# Patient Record
Sex: FEMALE | Race: WHITE
[De-identification: names, ages, dates, MRNs, and addresses within clinical notes are randomized per-mention and may not be internally consistent; named-entity substitution may affect disease eponyms.]

---

## 2017-06-18 NOTE — EMERGENCY ROOM VISIT NOTE
History


Report prepared by Mami:  Antonia Oliva


Under the Supervision of:  Dr. Veto Mas M.D.


First contact with patient:  04:55


Chief Complaint:  NECK PAIN


Stated Complaint:  NECK/HEAD PAIN





History of Present Illness


The patient is a 71 year old female who presents to the Emergency Room with 

complaints of neck pain beginning yesterday. She rates the pain at a 6/10. She 

reports that the pain goes from her shoulders to the back of her head, and that 

she was unable to sleep due to the pain. The patient denies fevers, abdominal 

pain, a sore throat, and changes in vision. Notes left lateral hand had some 

"numbness" though can feel well which has been ongoing much longer than neck 

issue.  She has taken no medication this evening for neck pain.  She has not 

had this previously.  Denies trauma nor injuries.  No neuro deficits.  Movement 

makes worse, rest makes better.  Tried cold packs without improvement.





   Source of History:  patient


   Onset:  yesterday


   Position:  neck


   Symptom Intensity:  rated at a 6/10


   Quality:  other (neck pain)


   Associated Symptoms:  No fevers, No sorethroat, No abdominal pain


Note:


additional symptom: numbness in left hand





Review of Systems


See HPI for pertinent positives & negatives. A total of 6 systems reviewed and 

were otherwise negative.





Past Medical & Surgical


Medical Problems:


(1) Hypotension








Family History





FHx: cancer


FHx: gallbladder disease


FHx: heart disease


Hypertension





Social History


Smoking Status:  Never Smoker


Alcohol Use:  none


Marital Status:  





Current/Historical Medications


Scheduled PRN


Cyclobenzaprine Hcl (Flexeril), 10 MG PO TID PRN for Muscle Spasms


Oxycodone Immediate Rel Tab (Roxicodone Ir), 1-2 TAB PO Q4H PRN for Severe Pain





Allergies


Coded Allergies:  


     No Known Allergies (Unverified , 6/12/13)





Physical Exam


Vital Signs











  Date Time  Temp Pulse Resp B/P (MAP) Pulse Ox O2 Delivery O2 Flow Rate FiO2


 


6/18/17 06:27  50 18 140/72 95 Room Air  


 


6/18/17 04:49 36.8 75 18 152/73 93 Room Air  











Physical Exam


GENERAL: Patient is uncomfortable appearing and in mild distress.


HEENT: No acute trauma, normocephalic atraumatic, mucous membranes moist, no 

nasal congestion, no scleral icterus.


NECK: No stridor, no adenopathy, no meningismus, trachea is midline. Paraspinal 

muscle spasms bilaterally with straightening of cervical spine. 


LUNGS: No dyspnea. Clear to auscultation and equal bilaterally. No wheeze, no 

rhonchi.


HEART: Regular rate and rhythm.  No murmurs, rubs, gallops appreciated.


EXTREMITIES: Normal motion all extremities, no cyanosis, no edema.


NEUROLOGIC: Alert and oriented, no acute motor or sensory deficits, no focal 

weakness, cranial nerves grossly intact.


SKIN: No rash, no jaundice, no diaphoresis.





Medical Decision & Procedures


ER Provider


Diagnostic Interpretation:


X ray results are stated below per my interpretation and the radiologist's 

interpretation. 





X ray 4 views cervical spine: Straightening of normal curvature, diffuse 

arthritic changes. No acute step offs nor fractures appreciated.





Laboratory Results


6/18/17 05:09








Red Blood Count 4.84, Mean Corpuscular Volume 88.2, Mean Corpuscular Hemoglobin 

30.0, Mean Corpuscular Hemoglobin Concent 34.0, Mean Platelet Volume 8.6, 

Neutrophils (%) (Auto) 45.0, Lymphocytes (%) (Auto) 37.9, Monocytes (%) (Auto) 

12.6, Eosinophils (%) (Auto) 3.7, Basophils (%) (Auto) 0.6, Neutrophils # (Auto

) 3.65, Lymphocytes # (Auto) 3.08, Monocytes # (Auto) 1.02, Eosinophils # (Auto

) 0.30, Basophils # (Auto) 0.05





6/18/17 05:09

















Test


  6/18/17


05:09


 


White Blood Count


  8.12 K/uL


(4.8-10.8)


 


Red Blood Count


  4.84 M/uL


(4.2-5.4)


 


Hemoglobin


  14.5 g/dL


(12.0-16.0)


 


Hematocrit 42.7 % (37-47) 


 


Mean Corpuscular Volume


  88.2 fL


()


 


Mean Corpuscular Hemoglobin


  30.0 pg


(25-34)


 


Mean Corpuscular Hemoglobin


Concent 34.0 g/dl


(32-36)


 


Platelet Count


  287 K/uL


(130-400)


 


Mean Platelet Volume


  8.6 fL


(7.4-10.4)


 


Neutrophils (%) (Auto) 45.0 % 


 


Lymphocytes (%) (Auto) 37.9 % 


 


Monocytes (%) (Auto) 12.6 % 


 


Eosinophils (%) (Auto) 3.7 % 


 


Basophils (%) (Auto) 0.6 % 


 


Neutrophils # (Auto)


  3.65 K/uL


(1.4-6.5)


 


Lymphocytes # (Auto)


  3.08 K/uL


(1.2-3.4)


 


Monocytes # (Auto)


  1.02 K/uL


(0.11-0.59)


 


Eosinophils # (Auto)


  0.30 K/uL


(0-0.5)


 


Basophils # (Auto)


  0.05 K/uL


(0-0.2)


 


RDW Standard Deviation


  40.1 fL


(36.4-46.3)


 


RDW Coefficient of Variation


  12.5 %


(11.5-14.5)


 


Immature Granulocyte % (Auto) 0.2 % 


 


Immature Granulocyte # (Auto)


  0.02 K/uL


(0.00-0.02)


 


Anion Gap


  10.0 mmol/L


(3-11)


 


Est Creatinine Clear Calc


Drug Dose 70.6 ml/min 


 


 


Estimated GFR (


American) 87.2 


 


 


Estimated GFR (Non-


American 75.2 


 


 


BUN/Creatinine Ratio 23.7 (10-20) 


 


Calcium Level


  8.9 mg/dl


(8.5-10.1)





Laboratory results as reviewed by me.





Medications Administered











 Medications


  (Trade)  Dose


 Ordered  Sig/Dell


 Route  Start Time


 Stop Time Status Last Admin


Dose Admin


 


 Sodium Chloride  1,000 ml @ 


 999 mls/hr  Q1H1M STAT


 IV  6/18/17 05:01


 6/18/17 06:01 DC 6/18/17 05:34


999 MLS/HR


 


 Ketorolac


 Tromethamine


  (Toradol Inj)  30 mg  NOW  STAT


 IV  6/18/17 05:01


 6/18/17 05:04 DC 6/18/17 05:33


30 MG


 


 Morphine Sulfate


  (MoRPHine


 SULFATE INJ)  4 mg  NOW  STAT


 IV  6/18/17 05:01


 6/18/17 05:05 DC 6/18/17 05:33


4 MG


 


 Diazepam


  (Valium Inj)  2.5 mg  NOW  STAT


 IV  6/18/17 05:01


 6/18/17 05:05 DC 6/18/17 05:33


2.5 MG


 


 Oxycodone HCl


  (Roxicodone


 Immediate Rel 5MG


 Home Pack)  1 homepack  UD  ONCE


 PO  6/18/17 06:15


 6/18/17 06:16 DC 6/18/17 06:15


1 HOMEPACK


 


 Cyclobenzaprine


 HCl


  (FLEXERIL 10MG


 Home Pack)  1 homepack  UD  ONCE


 PO  6/18/17 06:15


 6/18/17 06:16 DC 6/18/17 06:15


1 HOMEPACK











ED Course


0456: The patient was evaluated in room B12. A complete history and physical 

exam was performed.





0501: Ordered Diazepam 2.5 mg IV, Morphine Sulfate 4 mg IV, Toradol Inj 30 mg IV

, Sodium Chloride 1,000 ml @ 999 mls/hr IV. 





0538: The patient notes that she is starting to feel better. 





0615: Ordered Cyclobenzaprine HCl 1 homepack PO, Oxycodone HCl 1 homepack PO.





0620: The patient is feeling much better and she would like to see how she does 

at home. She will follow up with her PCP  about her blood pressure and neck 

arthritis.





Medical Decision


Medication Reconciliation:  I attest that I have personally reviewed the patient

's current medication list.


Blood pressure screening: Patient was found to have an elevated blood pressure 

and was referred to their primary doctor for recheck and further treatment.





71 yr old female arrives for evaluation of bilateral neck pain radiating to low 

posterior scalp.  Exam consistent with significant muscle spasm bilaterally and 

cervical imaging with straightening cervical spine.  No acute fracture.  She 

does not have meningitis by exam/history.  She does not have cause nor findings 

of dissection.  No evidence of cellulitis nor abscess.  Labs unremarkable.  

Admits increased gardening and I suspect she did something to strain neck with 

that.  Without neurologic deficits I do not feel neuro imaging indicated.  She 

notes left lateral hand tingling for a long time that has been ongoing without 

change.  I advised she discuss symptoms, BP elevation and ED visit with her 

PCP.  We reviewed symptoms requiring return.  She is feeling much improved and 

comfortable with discharge.  Reviewed restrictions/risks of narcotics, along 

with flexeril.  Stable and feeling well at discharge.





Impression





 Primary Impression:  


 Muscle spasms of neck


 Additional Impressions:  


 Hypertension


 Cervical spine degeneration





Scribe Attestation


The scribe's documentation has been prepared under my direction and personally 

reviewed by me in its entirety. I confirm that the note above accurately 

reflects all work, treatment, procedures, and medical decision making performed 

by me.





Departure Information


Dispostion


Home / Self-Care





Prescriptions





Cyclobenzaprine Hcl (FLEXERIL) 10 Mg Tab


10 MG PO TID Y for Muscle Spasms, #12 TAB


   Prov: Veto Mas M.D.         6/18/17 


Oxycodone Immediate Rel Tab (ROXICODONE IR) 5 Mg Tab


1-2 TAB PO Q4H Y for Severe Pain, #12 TAB


   Prov: Veto Mas M.D.         6/18/17





Referrals


Primary Care Provider





Patient Instructions


ED Spasm Neck No Injury, My [facility]





Additional Instructions





Your blood pressure was elevated during this visit.  This is quite common in 

many people who are being evaluated in the Emergency Department for many 

reasons.  However, it is important that you have your Primary Care Provider 

recheck your blood pressure and discuss whether treatment will be needed.  


Long term elevated blood pressure can lead to strokes, heart attacks, kidney 

failure amongst other medical issues.  If you develop severe headaches, chest 

pain, weakness in arms or legs, or other concerning symptoms call 911.





You have received a narcotic pain medication prescription.  These medications 

may cause drowsiness and should not be used with other sedative medications.  

Do not drive, drink alcohol, perform dangerous activities, nor make important 

decisions after taking these medications.  Long term use or inappropriate use 

may lead to addiction.





Problem Qualifiers

## 2017-06-18 NOTE — DIAGNOSTIC IMAGING REPORT
CERVICAL SPINE 3 VIEWS



HISTORY:      neck spasm/pain



COMPARISON: None.



FINDINGS: The cervical spine is visualized from C1 through the superior endplate

of T1. There is no fracture.  No subluxation. Mild facet degenerative changes

throughout the majority of the cervical spine. There is mild disc space

narrowing and small endplate osteophytes at C4-C5, C5-C6, and C6-C7.

Prevertebral soft tissues and the atlantodens interval are intact.



IMPRESSION:  

No fracture or subluxation within the cervical spine. Mild degenerative changes

as described above.







Electronically signed by:  Jm Pratt M.D.

6/18/2017 8:32 AM



Dictated Date/Time:  6/18/2017 8:31 AM

## 2017-07-21 NOTE — MAMMOGRAPHY REPORT
BILATERAL DIGITAL SCREENING MAMMOGRAM TOMOSYNTHESIS WITH CAD: 7/20/2017

CLINICAL HISTORY: Routine screening examination.  





TECHNIQUE:  Breast tomosynthesis in addition to standard 2D mammography was performed. Current study 
was also evaluated with a Computer Aided Detection (CAD) system.  



COMPARISON: Comparison is made to exams dated:  7/18/2016 mammogram, 7/16/2015 mammogram, 7/15/2014 m
ammogram, 7/10/2013 mammogram, 7/9/2012 mammogram, and 7/7/2011 mammogram - [facility].   



BREAST COMPOSITION:  There are scattered areas of fibroglandular density in both breasts.  



FINDINGS: An 8 mm mass in the upper outer quadrant of the right breast has been stable in size on all
 available prior mammograms dating back to at least 04/18/2008, therefore likely benign.  There are a
 few benign-appearing micro-calcifications scattered bilaterally.  No suspicious mass, architectural 
distortion or cluster of suspicious microcalcifications is seen.  



IMPRESSION:  ACR BI-RADS CATEGORY 1: NEGATIVE

There is no mammographic evidence of malignancy. A 1 year screening mammogram is recommended.  The pa
tient will receive written notification of the results.  





Approximately 10% of breast cancers are not detected with mammography. A negative mammographic report
 should not delay biopsy if a clinically suggestive mass is present.



Elizabeth Oliver M.D.          

ay/:7/20/2017 16:34:44  



Imaging Technologist: Bianca MCKEON)(CELESTINO), [facility]

letter sent: Normal 1/2  

BI-RADS Code: ACR BI-RADS Category 1: Negative

## 2018-07-23 ENCOUNTER — HOSPITAL ENCOUNTER (OUTPATIENT)
Dept: HOSPITAL 45 - C.MAMM | Age: 73
Discharge: HOME | End: 2018-07-23
Attending: PHYSICIAN ASSISTANT
Payer: COMMERCIAL

## 2018-07-23 DIAGNOSIS — Z12.31: Primary | ICD-10-CM

## 2018-07-24 NOTE — MAMMOGRAPHY REPORT
BILATERAL DIGITAL SCREENING MAMMOGRAM TOMOSYNTHESIS WITH CAD: 7/23/2018

CLINICAL HISTORY: Routine screening. Patient has no complaints.  





TECHNIQUE: The study was acquired using full field digital technology and interpreted from soft copy.
 Breast tomosynthesis in addition to standard 2D mammography was performed. Current study was also ev
aluated with a Computer Aided Detection (CAD) system.  



COMPARISON: Comparison is made to exams dated:  7/20/2017 mammogram, 7/18/2016 mammogram, 7/16/2015 m
ammogram, 7/15/2014 mammogram, 7/10/2013 mammogram, and 7/9/2012 mammogram - [facility]
nter.   

BREAST COMPOSITION: There are scattered areas of fibroglandular density in both breasts.  



FINDINGS: A 9 mm mass in the right upper outer quadrant is stable mammographically dating back to at 
least 4/20/2009, therefore likely benign. No suspicious mass, architectural distortion or cluster of 
microcalcifications is seen.  



IMPRESSION: ACR BI-RADS CATEGORY 1: NEGATIVE

There is no mammographic evidence of malignancy. A 1 year screening mammogram is recommended.(07/24/2
019)  The patient will receive written notification of the results.  





Some breast cancers are not detected with mammography. A negative mammographic report should not lindsay
y biopsy if a clinically suggestive mass is present.



Elizabeth Oliver M.D.          

ay/:7/23/2018 16:26:01  



Imaging Technologist: RT Griffin(LAZARA)(CELESTINO)(BD), [facility]

letter sent: Normal 1/2  

BI-RADS Code: ACR BI-RADS Category 1: Negative